# Patient Record
(demographics unavailable — no encounter records)

---

## 2024-11-06 NOTE — PHYSICAL EXAM
[Alert] : alert [No Acute Distress] : no acute distress [Well Developed] : well developed [Normal Voice/Communication] : normal voice communication [Normal Sclera/Conjunctiva] : normal sclera/conjunctiva [EOMI] : extra ocular movement intact [No Proptosis] : no proptosis [No Lid Lag] : no lid lag [No LAD] : no lymphadenopathy [Supple] : the neck was supple [Thyroid Not Enlarged] : the thyroid was not enlarged [No Thyroid Nodules] : no palpable thyroid nodules [No Respiratory Distress] : no respiratory distress [No Accessory Muscle Use] : no accessory muscle use [Normal Rate and Effort] : normal respiratory rate and effort [No Stigmata of Cushings Syndrome] : no stigmata of Cushings Syndrome [Normal Gait] : normal gait [No Involuntary Movements] : no involuntary movements were seen [Oriented x3] : oriented to person, place, and time [Normal Insight/Judgement] : insight and judgment were intact

## 2024-11-13 NOTE — ASSESSMENT
[Levothyroxine] : The patient was instructed to take Levothyroxine on an empty stomach, separate from vitamins, and wait at least 30 minutes before eating [FreeTextEntry1] : Hashimoto hypothyroidism Currently on Synthroid brand 137mcg 7.5 tab/week (may need PA for this ) She didn't tolerate generic levothyroxine due to inefficient levels. Clinically euthyroid today Bloodwork was collected in office today, will f/u results accordingly.  Answered all questions today; patient verbalized understanding of the above RTO in 6 months/once provider is back from maternity leave

## 2024-11-13 NOTE — HISTORY OF PRESENT ILLNESS
[FreeTextEntry1] : CRISTI HAGER  is a 49 year old female with past medical history of Hashimoto hypothyroidism, GERD who presents today for management of hypothyroidism   She was diagnosed with hypothyroidism at age 20yo.She is taking Synthroid 137mcg 8 tab/week (takes extra half tablet on 1 days of week) for hypothyroidism as per last visit in spring of 2023. She did not recheck TFTs after that.  Patient is otherwise doing well, denies any new complaints today She follows gluten free diet.

## 2025-07-30 NOTE — HISTORY OF PRESENT ILLNESS
[FreeTextEntry1] : CRISTI HAGER  is a 50 year old female with past medical history of Hashimoto hypothyroidism, GERD who presents today for management of hypothyroidism   She was diagnosed with hypothyroidism at age 20yo.She is taking Synthroid 137mcg 8 tab/week (takes extra half tablet on 1 days of week) for hypothyroidism as per last visit in spring of 2023. She did not recheck TFTs after that.  Patient is otherwise doing well, denies any new complaints today She follows gluten free diet.

## 2025-07-30 NOTE — ASSESSMENT
[Levothyroxine] : The patient was instructed to take Levothyroxine on an empty stomach, separate from vitamins, and wait at least 30 minutes before eating [FreeTextEntry1] : Hashimoto hypothyroidism Currently on Synthroid brand 137mcg 7.5 tab/week  (She didn't tolerate generic levothyroxine due to inefficient levels.) Clinically euthyroid today Bloodwork was collected in office today, will f/u results accordingly.  Will check iron/CBC levels in view of h/o anemia  Answered all questions today; patient verbalized understanding of the above RTO in 6 months